# Patient Record
Sex: MALE | Race: WHITE
[De-identification: names, ages, dates, MRNs, and addresses within clinical notes are randomized per-mention and may not be internally consistent; named-entity substitution may affect disease eponyms.]

---

## 2021-09-30 ENCOUNTER — RX ONLY (OUTPATIENT)
Age: 56
Setting detail: RX ONLY
End: 2021-09-30

## 2021-10-06 ENCOUNTER — APPOINTMENT (RX ONLY)
Dept: URBAN - METROPOLITAN AREA CLINIC 154 | Facility: CLINIC | Age: 56
Setting detail: DERMATOLOGY
End: 2021-10-06

## 2021-10-06 DIAGNOSIS — L64.8 OTHER ANDROGENIC ALOPECIA: ICD-10-CM | Status: WORSENING

## 2021-10-06 PROCEDURE — ? FULL BODY SKIN EXAM - DECLINED

## 2021-10-06 PROCEDURE — ? COUNSELING

## 2021-10-06 PROCEDURE — ? TREATMENT REGIMEN

## 2021-10-06 PROCEDURE — 99203 OFFICE O/P NEW LOW 30 MIN: CPT

## 2021-10-06 PROCEDURE — ? ADDITIONAL NOTES

## 2021-10-06 ASSESSMENT — LOCATION SIMPLE DESCRIPTION DERM: LOCATION SIMPLE: ANTERIOR SCALP

## 2021-10-06 ASSESSMENT — LOCATION ZONE DERM: LOCATION ZONE: SCALP

## 2021-10-06 ASSESSMENT — LOCATION DETAILED DESCRIPTION DERM: LOCATION DETAILED: MID-FRONTAL SCALP

## 2021-10-06 NOTE — PROCEDURE: TREATMENT REGIMEN
Continue Regimen: Propecia 1mg po daily (already has erx from another physician)\\nMinoxidil 5% topically once- twice daily
Plan: Discussed propecia side effects. Discussed the proscriptix bundle, patient wanted to follow up with Dr Montgomery first. \\nfollow up with dr montgomery for hair transplant, as patient is ready to move forward with transplant option at this point
Detail Level: Simple

## 2021-10-06 NOTE — HPI: HAIR LOSS
Previous Labs: No
How Did The Hair Loss Occur?: gradual in onset
How Severe Is Your Hair Loss?: moderate
What Hair Products Do You Use?: Pantene shampoo
Additional History: He would like to be referred to get process of hair transplant started